# Patient Record
Sex: MALE | Race: WHITE
[De-identification: names, ages, dates, MRNs, and addresses within clinical notes are randomized per-mention and may not be internally consistent; named-entity substitution may affect disease eponyms.]

---

## 2020-08-10 ENCOUNTER — HOSPITAL ENCOUNTER (INPATIENT)
Dept: HOSPITAL 19 - ICU | Age: 80
LOS: 4 days | Discharge: HOME | DRG: 243 | End: 2020-08-14
Attending: STUDENT IN AN ORGANIZED HEALTH CARE EDUCATION/TRAINING PROGRAM | Admitting: STUDENT IN AN ORGANIZED HEALTH CARE EDUCATION/TRAINING PROGRAM
Payer: COMMERCIAL

## 2020-08-10 VITALS — OXYGEN SATURATION: 96 %

## 2020-08-10 VITALS — OXYGEN SATURATION: 98 %

## 2020-08-10 VITALS — OXYGEN SATURATION: 95 %

## 2020-08-10 VITALS — OXYGEN SATURATION: 93 %

## 2020-08-10 VITALS — OXYGEN SATURATION: 94 %

## 2020-08-10 VITALS — WEIGHT: 257.5 LBS | BODY MASS INDEX: 34.88 KG/M2 | HEIGHT: 72.01 IN

## 2020-08-10 VITALS — OXYGEN SATURATION: 97 %

## 2020-08-10 VITALS — SYSTOLIC BLOOD PRESSURE: 133 MMHG | DIASTOLIC BLOOD PRESSURE: 80 MMHG | HEART RATE: 54 BPM

## 2020-08-10 DIAGNOSIS — D64.9: ICD-10-CM

## 2020-08-10 DIAGNOSIS — N39.0: ICD-10-CM

## 2020-08-10 DIAGNOSIS — I44.2: Primary | ICD-10-CM

## 2020-08-10 DIAGNOSIS — I11.0: ICD-10-CM

## 2020-08-10 DIAGNOSIS — Z95.1: ICD-10-CM

## 2020-08-10 DIAGNOSIS — I50.9: ICD-10-CM

## 2020-08-10 DIAGNOSIS — M51.36: ICD-10-CM

## 2020-08-10 DIAGNOSIS — E11.51: ICD-10-CM

## 2020-08-10 DIAGNOSIS — F17.210: ICD-10-CM

## 2020-08-10 DIAGNOSIS — E87.6: ICD-10-CM

## 2020-08-10 DIAGNOSIS — Z66: ICD-10-CM

## 2020-08-10 DIAGNOSIS — I25.10: ICD-10-CM

## 2020-08-10 DIAGNOSIS — G93.49: ICD-10-CM

## 2020-08-10 DIAGNOSIS — E66.9: ICD-10-CM

## 2020-08-10 DIAGNOSIS — R79.89: ICD-10-CM

## 2020-08-10 DIAGNOSIS — E78.5: ICD-10-CM

## 2020-08-10 PROCEDURE — C1898 LEAD, PMKR, OTHER THAN TRANS: HCPCS

## 2020-08-10 PROCEDURE — C1785 PMKR, DUAL, RATE-RESP: HCPCS

## 2020-08-10 PROCEDURE — C1769 GUIDE WIRE: HCPCS

## 2020-08-10 PROCEDURE — C1894 INTRO/SHEATH, NON-LASER: HCPCS

## 2020-08-10 NOTE — NUR
PT ADITTED TO ICU 2 FROM CATH LAB WITH TEMP PACER IN PLACE, PT VSS AT THIS
TIME, DENIES ANY PAIN. PT ORIENTATED TO ROOM AND RE-ORIENTED TO DX, TEMP
PACER, AND POC. WILL CONTINUE TO MONTIOR AND AWAIT ORDERS, FOR MEDS AND
ELECTROLYTE REPLACEMENT.

## 2020-08-10 NOTE — NUR
SEE MERGE DOCUMENTATION FOR MEDICATION ADMINISTRATION TIMES AND INTRA/POST
PROCEDURE SEDATION ASSESSMENTS. PT DIRECT TO CATH LAB VIA EMS. PT A&OX3 AND
VERBALIZES UNDERSTANDING OF PROCEDURE. VERBAL CONSENT OBTAINED. PT ALSO
VERBALLY CONSENTS TO BLOOD ADMIN IF NEEDED. VERIFIED WITH NANCY MOLINA CATH
LAB.

## 2020-08-11 VITALS — OXYGEN SATURATION: 91 %

## 2020-08-11 VITALS — OXYGEN SATURATION: 93 %

## 2020-08-11 VITALS — OXYGEN SATURATION: 92 %

## 2020-08-11 VITALS — HEART RATE: 80 BPM | SYSTOLIC BLOOD PRESSURE: 122 MMHG | TEMPERATURE: 98.7 F | DIASTOLIC BLOOD PRESSURE: 66 MMHG

## 2020-08-11 VITALS — OXYGEN SATURATION: 94 %

## 2020-08-11 VITALS
HEART RATE: 79 BPM | TEMPERATURE: 98 F | SYSTOLIC BLOOD PRESSURE: 131 MMHG | DIASTOLIC BLOOD PRESSURE: 71 MMHG | OXYGEN SATURATION: 94 %

## 2020-08-11 VITALS — OXYGEN SATURATION: 90 %

## 2020-08-11 VITALS
TEMPERATURE: 98.4 F | SYSTOLIC BLOOD PRESSURE: 168 MMHG | DIASTOLIC BLOOD PRESSURE: 75 MMHG | HEART RATE: 61 BPM | OXYGEN SATURATION: 94 %

## 2020-08-11 VITALS — OXYGEN SATURATION: 95 %

## 2020-08-11 VITALS — OXYGEN SATURATION: 96 %

## 2020-08-11 VITALS — HEART RATE: 75 BPM | TEMPERATURE: 98.1 F | DIASTOLIC BLOOD PRESSURE: 68 MMHG | SYSTOLIC BLOOD PRESSURE: 175 MMHG

## 2020-08-11 VITALS — HEART RATE: 68 BPM | DIASTOLIC BLOOD PRESSURE: 66 MMHG | SYSTOLIC BLOOD PRESSURE: 122 MMHG | OXYGEN SATURATION: 91 %

## 2020-08-11 VITALS — HEART RATE: 61 BPM | SYSTOLIC BLOOD PRESSURE: 111 MMHG | DIASTOLIC BLOOD PRESSURE: 57 MMHG

## 2020-08-11 VITALS — DIASTOLIC BLOOD PRESSURE: 102 MMHG | TEMPERATURE: 97.9 F | SYSTOLIC BLOOD PRESSURE: 178 MMHG | HEART RATE: 85 BPM

## 2020-08-11 VITALS — HEART RATE: 65 BPM | SYSTOLIC BLOOD PRESSURE: 130 MMHG | DIASTOLIC BLOOD PRESSURE: 64 MMHG

## 2020-08-11 VITALS — OXYGEN SATURATION: 89 %

## 2020-08-11 VITALS — OXYGEN SATURATION: 88 %

## 2020-08-11 VITALS — HEART RATE: 69 BPM | DIASTOLIC BLOOD PRESSURE: 62 MMHG | SYSTOLIC BLOOD PRESSURE: 144 MMHG | TEMPERATURE: 97.9 F

## 2020-08-11 VITALS — DIASTOLIC BLOOD PRESSURE: 70 MMHG | HEART RATE: 67 BPM | SYSTOLIC BLOOD PRESSURE: 141 MMHG

## 2020-08-11 VITALS
HEART RATE: 66 BPM | TEMPERATURE: 98.5 F | DIASTOLIC BLOOD PRESSURE: 87 MMHG | OXYGEN SATURATION: 93 % | SYSTOLIC BLOOD PRESSURE: 186 MMHG

## 2020-08-11 VITALS
OXYGEN SATURATION: 94 % | TEMPERATURE: 98 F | SYSTOLIC BLOOD PRESSURE: 176 MMHG | DIASTOLIC BLOOD PRESSURE: 100 MMHG | HEART RATE: 68 BPM

## 2020-08-11 VITALS — OXYGEN SATURATION: 85 %

## 2020-08-11 VITALS — HEART RATE: 71 BPM

## 2020-08-11 VITALS — SYSTOLIC BLOOD PRESSURE: 153 MMHG | DIASTOLIC BLOOD PRESSURE: 67 MMHG

## 2020-08-11 VITALS — SYSTOLIC BLOOD PRESSURE: 131 MMHG | TEMPERATURE: 98 F | DIASTOLIC BLOOD PRESSURE: 71 MMHG | HEART RATE: 79 BPM

## 2020-08-11 VITALS — SYSTOLIC BLOOD PRESSURE: 136 MMHG | DIASTOLIC BLOOD PRESSURE: 63 MMHG

## 2020-08-11 VITALS
SYSTOLIC BLOOD PRESSURE: 136 MMHG | OXYGEN SATURATION: 93 % | HEART RATE: 74 BPM | DIASTOLIC BLOOD PRESSURE: 76 MMHG | TEMPERATURE: 98 F

## 2020-08-11 VITALS — OXYGEN SATURATION: 87 %

## 2020-08-11 VITALS — OXYGEN SATURATION: 86 %

## 2020-08-11 VITALS
HEART RATE: 69 BPM | OXYGEN SATURATION: 91 % | SYSTOLIC BLOOD PRESSURE: 136 MMHG | TEMPERATURE: 98 F | DIASTOLIC BLOOD PRESSURE: 76 MMHG

## 2020-08-11 VITALS — HEART RATE: 80 BPM

## 2020-08-11 LAB
ALBUMIN SERPL-MCNC: 2.8 GM/DL (ref 3.5–5)
ALP SERPL-CCNC: 99 U/L (ref 50–136)
ALT SERPL-CCNC: 13 U/L (ref 4–49)
ANION GAP SERPL CALC-SCNC: 2 MMOL/L (ref 7–16)
ANION GAP SERPL CALC-SCNC: 3 MMOL/L (ref 7–16)
AST SERPL-CCNC: 17 U/L (ref 15–37)
BASOPHILS # BLD: 0 10*3/UL (ref 0–0.2)
BASOPHILS # BLD: 0 10*3/UL (ref 0–0.2)
BASOPHILS NFR BLD AUTO: 0.3 % (ref 0–2)
BASOPHILS NFR BLD AUTO: 0.3 % (ref 0–2)
BILIRUB SERPL-MCNC: 0.6 MG/DL (ref 0–1)
BUN SERPL-MCNC: 10 MG/DL (ref 9–20)
BUN SERPL-MCNC: 11 MG/DL (ref 9–20)
CALCIUM SERPL-MCNC: 8 MG/DL (ref 8.4–10.2)
CALCIUM SERPL-MCNC: 8 MG/DL (ref 8.4–10.2)
CHLORIDE SERPL-SCNC: 109 MMOL/L (ref 98–107)
CHLORIDE SERPL-SCNC: 110 MMOL/L (ref 98–107)
CO2 SERPL-SCNC: 26 MMOL/L (ref 22–30)
CO2 SERPL-SCNC: 26 MMOL/L (ref 22–30)
CREAT SERPL-SCNC: 0.82 UMOL/L (ref 0.66–1.25)
CREAT SERPL-SCNC: 0.82 UMOL/L (ref 0.66–1.25)
EOSINOPHIL # BLD: 0.2 10*3/UL (ref 0–0.7)
EOSINOPHIL # BLD: 0.2 10*3/UL (ref 0–0.7)
EOSINOPHIL NFR BLD: 2.8 % (ref 0–4)
EOSINOPHIL NFR BLD: 2.8 % (ref 0–4)
ERYTHROCYTE [DISTWIDTH] IN BLOOD BY AUTOMATED COUNT: 14.1 % (ref 11.5–14.5)
ERYTHROCYTE [DISTWIDTH] IN BLOOD BY AUTOMATED COUNT: 14.2 % (ref 11.5–14.5)
GLUCOSE SERPL-MCNC: 100 MG/DL (ref 74–106)
GLUCOSE SERPL-MCNC: 133 MG/DL (ref 74–106)
GRANULOCYTES # BLD AUTO: 72.2 % (ref 42.2–75.2)
GRANULOCYTES # BLD AUTO: 73.1 % (ref 42.2–75.2)
HCT VFR BLD AUTO: 37.5 % (ref 42–52)
HCT VFR BLD AUTO: 38 % (ref 42–52)
HGB BLD-MCNC: 12.5 G/DL (ref 13.5–18)
HGB BLD-MCNC: 12.6 G/DL (ref 13.5–18)
LYMPHOCYTES # BLD: 1 10*3/UL (ref 1.2–3.4)
LYMPHOCYTES # BLD: 1 10*3/UL (ref 1.2–3.4)
LYMPHOCYTES NFR BLD: 15.5 % (ref 20–51)
LYMPHOCYTES NFR BLD: 16.2 % (ref 20–51)
MAGNESIUM SERPL-MCNC: 1.9 MG/DL (ref 1.6–2.3)
MCH RBC QN AUTO: 32 PG (ref 27–31)
MCH RBC QN AUTO: 32 PG (ref 27–31)
MCHC RBC AUTO-ENTMCNC: 33 G/DL (ref 33–37)
MCHC RBC AUTO-ENTMCNC: 33 G/DL (ref 33–37)
MCV RBC AUTO: 96 FL (ref 80–100)
MCV RBC AUTO: 96 FL (ref 80–100)
MONOCYTES # BLD: 0.5 10*3/UL (ref 0.1–0.6)
MONOCYTES # BLD: 0.5 10*3/UL (ref 0.1–0.6)
MONOCYTES NFR BLD AUTO: 8 % (ref 1.7–9.3)
MONOCYTES NFR BLD AUTO: 8.2 % (ref 1.7–9.3)
NEUTROPHILS # BLD: 4.4 10*3/UL (ref 1.4–6.5)
NEUTROPHILS # BLD: 4.6 10*3/UL (ref 1.4–6.5)
PLATELET # BLD AUTO: 147 K/MM3 (ref 130–400)
PLATELET # BLD AUTO: 150 K/MM3 (ref 130–400)
PMV BLD AUTO: 11.9 FL (ref 7.4–10.4)
PMV BLD AUTO: 11.9 FL (ref 7.4–10.4)
POTASSIUM SERPL-SCNC: 3.5 MMOL/L (ref 3.4–5)
POTASSIUM SERPL-SCNC: 3.7 MMOL/L (ref 3.4–5)
PROT SERPL-MCNC: 5.7 GM/DL (ref 6.4–8.2)
RBC # BLD AUTO: 3.9 M/MM3 (ref 4.2–5.6)
RBC # BLD AUTO: 3.97 M/MM3 (ref 4.2–5.6)
SODIUM SERPL-SCNC: 137 MMOL/L (ref 137–145)
SODIUM SERPL-SCNC: 138 MMOL/L (ref 137–145)

## 2020-08-11 PROCEDURE — 0JH606Z INSERTION OF PACEMAKER, DUAL CHAMBER INTO CHEST SUBCUTANEOUS TISSUE AND FASCIA, OPEN APPROACH: ICD-10-PCS | Performed by: INTERNAL MEDICINE

## 2020-08-11 PROCEDURE — 02H63JZ INSERTION OF PACEMAKER LEAD INTO RIGHT ATRIUM, PERCUTANEOUS APPROACH: ICD-10-PCS | Performed by: INTERNAL MEDICINE

## 2020-08-11 PROCEDURE — 02HK3JZ INSERTION OF PACEMAKER LEAD INTO RIGHT VENTRICLE, PERCUTANEOUS APPROACH: ICD-10-PCS | Performed by: INTERNAL MEDICINE

## 2020-08-11 NOTE — NUR
SW attended clinical rounds. The patient is to have a pacemaker placed today.
SW then followed up with the patient to discuss discharge plan. The patient
was living alone in [place], [place] and was on his way to [place], [place] to live
with his girlfriend, Nancy Valverde (ph#[phone]). He states that his
belongings are being shipped from [place] and should be arriving in
[facility] soon. He reports independence with ADLs and has a cane and four wheel
walker. The patient's PCP in [place] was Dr. Mendoza. He reports no difficulties
obtaining his meds. The patient does not have a DPOA-HC completed, but he was
interested in obtaining a form. SW provided the form. The patient states that
he is not , does not have any children, and does not have any siblings.
SW asked him about Demetra Obregon on his person to notify, when states she is
his daughter. The patient states that she is his ex-fiancee's daughter. The
patient states that he has already been in contact with his insurance company
and plans to rent a car after he discharges and drive to [facility]. SW to
continue to follow.

## 2020-08-11 NOTE — NUR
Patient was transferred from ICU after pacemaker was placed. patient
complained of 3/10 pain. Insulin not required at this time, . Patient
tolerate diet. educated patient on left extremity restriction post pacmaker
placement.

## 2020-08-11 NOTE — NUR
ECHO tech in room. Pt AAOx4, transvenous pacer insertion site stable
MD Jemal at bedside, plan to keep pt NPO for dual chamber PPM. EKG to be
completed while pacer is paused - RT notified
Call light in reach

## 2020-08-11 NOTE — NUR
Resting in bed. Assessment complete. Lungs clear. Heart sounds normal. Bowels
active x4. Pulses present. Bilateral lower extremity edema +2. INT right AC
without complications. Reporting pain in left shoulder at pacer site. Will
provided PRN tylenol. Denies other needs at this time. Call light in reach.

## 2020-08-12 VITALS — HEART RATE: 64 BPM | SYSTOLIC BLOOD PRESSURE: 138 MMHG | DIASTOLIC BLOOD PRESSURE: 89 MMHG | TEMPERATURE: 98.1 F

## 2020-08-12 VITALS — DIASTOLIC BLOOD PRESSURE: 69 MMHG | SYSTOLIC BLOOD PRESSURE: 154 MMHG | HEART RATE: 72 BPM | TEMPERATURE: 97.6 F

## 2020-08-12 VITALS — SYSTOLIC BLOOD PRESSURE: 149 MMHG | TEMPERATURE: 97.9 F | HEART RATE: 73 BPM | DIASTOLIC BLOOD PRESSURE: 63 MMHG

## 2020-08-12 VITALS — HEART RATE: 77 BPM | TEMPERATURE: 98.2 F | SYSTOLIC BLOOD PRESSURE: 138 MMHG | DIASTOLIC BLOOD PRESSURE: 69 MMHG

## 2020-08-12 VITALS — DIASTOLIC BLOOD PRESSURE: 54 MMHG | HEART RATE: 79 BPM | TEMPERATURE: 98.2 F | SYSTOLIC BLOOD PRESSURE: 118 MMHG

## 2020-08-12 VITALS — TEMPERATURE: 98 F | SYSTOLIC BLOOD PRESSURE: 139 MMHG | HEART RATE: 65 BPM | DIASTOLIC BLOOD PRESSURE: 62 MMHG

## 2020-08-12 NOTE — NUR
Pt resting in the room, no C/O pain today, both IJ and pacermaker sited remain
CDI, VS have remained stable.

## 2020-08-12 NOTE — NUR
collaborated with NANCY Ayers and House Supervisor about discharge
plan. Patient is not able to continue driving to [facility] due to his pacemaker
being placed. SW met with patient who advised he can afford a bus ticket. SW
assisted patient with purchasing a [employer] ticket. Departure will be
08/14/20 @ 0630 from [place], [place]. House Supervisor confirmed [employer] will
provide transport to [place]. KATIE will follow up with [employer] tomorrow to
schedule a ride from the hospital to [place]. KATIE will print patient's ticket
and provide it to him tomorrow. Confirmation #[id number]. KATIE provided update to
patient's girlfriend, Nancy Valverde. KATIE will continue to follow.

## 2020-08-12 NOTE — NUR
Pt assessment completed and documented. Pt sitting up in chair at this time
watching television. Pt alert and oriented x3. Complaints of "itching" at
pacemaker incision site that pt states the ice pack he has on it is relieving
some of the discomfort. INT to right ac patent and has good blood return.
Telemetry on. Pt denies any other needs at this time. Call light within reach.
Will continue to monitor.

## 2020-08-12 NOTE — NUR
Patient had uneventful night. Pacer interrogation complete. Denies needs at
this time. Call light in reach.

## 2020-08-12 NOTE — NUR
Pt awake and alert upon entry, talkative, no C/O pain at this time, pacemaker
site CDI, IJ site CDI, shift assessments complete, left Pt call light in
reach, bed in lowest position.

## 2020-08-13 VITALS — HEART RATE: 70 BPM | SYSTOLIC BLOOD PRESSURE: 129 MMHG | TEMPERATURE: 97.5 F | DIASTOLIC BLOOD PRESSURE: 67 MMHG

## 2020-08-13 VITALS — HEART RATE: 67 BPM | DIASTOLIC BLOOD PRESSURE: 64 MMHG | SYSTOLIC BLOOD PRESSURE: 144 MMHG | TEMPERATURE: 98.6 F

## 2020-08-13 VITALS — DIASTOLIC BLOOD PRESSURE: 62 MMHG | TEMPERATURE: 98.2 F | HEART RATE: 76 BPM | SYSTOLIC BLOOD PRESSURE: 130 MMHG

## 2020-08-13 VITALS — TEMPERATURE: 97.9 F | HEART RATE: 75 BPM | SYSTOLIC BLOOD PRESSURE: 117 MMHG | DIASTOLIC BLOOD PRESSURE: 56 MMHG

## 2020-08-13 VITALS — HEART RATE: 68 BPM | TEMPERATURE: 97.5 F | DIASTOLIC BLOOD PRESSURE: 64 MMHG | SYSTOLIC BLOOD PRESSURE: 123 MMHG

## 2020-08-13 VITALS — HEART RATE: 93 BPM | TEMPERATURE: 97.4 F | DIASTOLIC BLOOD PRESSURE: 74 MMHG | SYSTOLIC BLOOD PRESSURE: 139 MMHG

## 2020-08-13 VITALS — HEART RATE: 84 BPM | SYSTOLIC BLOOD PRESSURE: 114 MMHG | TEMPERATURE: 98.4 F | DIASTOLIC BLOOD PRESSURE: 70 MMHG

## 2020-08-13 LAB
ANION GAP SERPL CALC-SCNC: 6 MMOL/L (ref 7–16)
BASOPHILS # BLD: 0 10*3/UL (ref 0–0.2)
BASOPHILS NFR BLD AUTO: 0.5 % (ref 0–2)
BUN SERPL-MCNC: 19 MG/DL (ref 9–20)
CALCIUM SERPL-MCNC: 8.1 MG/DL (ref 8.4–10.2)
CHLORIDE SERPL-SCNC: 99 MMOL/L (ref 98–107)
CO2 SERPL-SCNC: 32 MMOL/L (ref 22–30)
CREAT SERPL-SCNC: 1.01 UMOL/L (ref 0.66–1.25)
EOSINOPHIL # BLD: 0.2 10*3/UL (ref 0–0.7)
EOSINOPHIL NFR BLD: 2.8 % (ref 0–4)
ERYTHROCYTE [DISTWIDTH] IN BLOOD BY AUTOMATED COUNT: 14.1 % (ref 11.5–14.5)
GLUCOSE SERPL-MCNC: 133 MG/DL (ref 74–106)
GRANULOCYTES # BLD AUTO: 69 % (ref 42.2–75.2)
HCT VFR BLD AUTO: 39.4 % (ref 42–52)
HGB BLD-MCNC: 13.3 G/DL (ref 13.5–18)
LYMPHOCYTES # BLD: 1.1 10*3/UL (ref 1.2–3.4)
LYMPHOCYTES NFR BLD: 17.6 % (ref 20–51)
MCH RBC QN AUTO: 32 PG (ref 27–31)
MCHC RBC AUTO-ENTMCNC: 34 G/DL (ref 33–37)
MCV RBC AUTO: 95 FL (ref 80–100)
MONOCYTES # BLD: 0.6 10*3/UL (ref 0.1–0.6)
MONOCYTES NFR BLD AUTO: 9.2 % (ref 1.7–9.3)
NEUTROPHILS # BLD: 4.4 10*3/UL (ref 1.4–6.5)
PLATELET # BLD AUTO: 113 K/MM3 (ref 130–400)
PMV BLD AUTO: 12.8 FL (ref 7.4–10.4)
POTASSIUM SERPL-SCNC: 3.1 MMOL/L (ref 3.4–5)
RBC # BLD AUTO: 4.16 M/MM3 (ref 4.2–5.6)
SODIUM SERPL-SCNC: 137 MMOL/L (ref 137–145)

## 2020-08-13 NOTE — NUR
contacted [facility] and scheduled  time for tomorrow,
08/14/20 @ 0445. Patient will be picked up in the ED entrance. KATIE met with
patient and provided print out of his boarding passes. There is one for when
he arrives in [place], and another that he will need to present when they
arrive at [place], [place] and change busses. KATIE also printed out a purchase
receipt and trip schedule, which was emailed to KATIE. KATIE also made a copy and
placed on patient's chart, as a back up. SW provided  time for taxi
tomorrow along with price of ride to [place] which will be $120. Patient is
frustrated with the cost of the taxi ride and bus trip, but states he can
afford it as he was able to increase the limit on his credit card. KATIE
confirmed with [facility] Flood that they can take a credit or debit card. KATIE
reviewed schedule with patient who verbalized understanding. KATIE encouraged
patient to ask for help from [facility] staff if he has any questions when he
switches buses in [place]. Patient again verbalized understanding. KATIE
contacted patient's girlfriend, Nancy and left a message. KATIE collaborated the
above information to House Supervisor and RN, Armen. KATIE spoke with Armen about
having patient down in the ED entrance at 0430 to wait for Jamar Flood to
arrive. No additional needs at this time.

## 2020-08-13 NOTE — NUR
PATIENT UP INDEPENDENTLY IN ROOM AND HALLWAYS WITH NO C/O. TELE IN PLACE.
CHANGE OF SHIFT REPORT RECEIVED FROM DAY SHIFT NURSE.

## 2020-08-13 NOTE — NUR
Pt awake and alert upon entry, finished breakfast, no C/O pain this morning.
Shift assessments complete, left Pt call light in reach.

## 2020-08-13 NOTE — NUR
Pt had uneventful shift. Pt went to bed around 0100 after sitting up in his
recliner all evening. Pt has been resting in bed since. Complaints of itching
discomfort to pacemaker site overnight. INT to right ac patent and intact.
Dressing to pacemaker site CDI. Pt denies any other needs. Call light within
reach.

## 2020-08-13 NOTE — NUR
TELE IN PLACE, PATIENT WITH NO C/O CHEST PAIN OR SHORTNESS OF BREATH. REPORTS
ANTICIPATED DISCHARGE EARLY AM, TO LEAVE HOSPITAL FOR BUS TRIP TO [place] AT
0430 ON 8/14, WANTING TO MAKE SURE ALL HIS PAPERWORK IS READY AND IN ORDER FOR
HIM TO TAKE WITH HIM ON DISCHARGE. DENIES ANY NEEDS AT THIS TIME. UP
INDEPENDENTLY WITH NO PROBLEMS. DRESSING TO LEFT UPPER CHEST PACEMAKER SX SITE
CDI, REVIEWED KEEPING PACEMAKER INCISION COVERED DURING SHOWERS, CONTINUE WITH
PACEMAKER RESTRICTION, TO WEAR SLING TO LUE WHEN IN BED/DURING SLEEP.

## 2020-08-14 VITALS — TEMPERATURE: 97.6 F | DIASTOLIC BLOOD PRESSURE: 64 MMHG | SYSTOLIC BLOOD PRESSURE: 126 MMHG | HEART RATE: 78 BPM

## 2020-08-14 VITALS — HEART RATE: 70 BPM | SYSTOLIC BLOOD PRESSURE: 137 MMHG | TEMPERATURE: 98.4 F | DIASTOLIC BLOOD PRESSURE: 91 MMHG

## 2020-08-14 NOTE — NUR
TELE REPORTED PATIENT WITH AFIB W/RVR RUN LASTING 25-30 SEC WITH ,
RHYTHM NOW CURRENTLY PACED W/SPIKES. VS TAKEN, SEE [employer] FOR RESULTS WITH
PATIENT DENYING CHEST PAIN/SHORTNESS OF BREATHE. DR FRENCH INFORMED OF TELE
REPORT AND SUBSEQUENT ASSESSMENT WITH NO NEW ORDERS GIVEN.

## 2020-08-14 NOTE — NUR
was notified by House Supervisor that [facility] did not show up
to  patient this morning. KATIE collaborated with House SupervisorAzucena in Risk Management, and Stacey with the [facility] to establish
discharge plan. With approval from Stacey, KATIE purchased a bus ticket for
tomorrow, 08/15/20. Departure will be from [place] at 0630. Cost of ticket is
$186.99. KATIE provided printed tickets and schedule to patient. KATIE collaborated
with House Supervisor and Stacey to reserve a hotel room this evening in
[place]. KATIE was advised we contract with with [facility]. KATIE spoke with
David at the [facility] and made a reservation for this evening. Cost of hotel
is $105.81. K[facility]. Ray advised he could pick patient up today at 1500 to take patient
to the [facility] and could also  patient tomorrow, 08/15/20 @ 0400 at
[facility] [place] Cost of both
trips totals $130. KATIE collaborated with Stacey who provided check made out to
Ray Marinelli. KATIE contacted Ray again to notify him of method of payment
and to again confirm  time of tomorrow, 08/15/20 @ 0400. Ray states
he understands the importance of this and also verbalized understanding that
he is being paid upfront, so he needs to arrive on time tomorrow. KATIE provided
 time and contact information [facility] to patient.
Patient verbalized understanding of the plan including transportation
arrangements and bus schedule. KATIE assisted patient in setting an alarm on his
phone for tomorrow at 0300. KATIE also contacted David at the [facility] and
requested a wake up call of 0300. David advised that they will call up to the
room and if patient does not answer, they will send a staff member up to knock
on the door. KATIE stressed the importance of patient getting up on time
tomorrow. KATIE emailed receipts for bus ticket and hotel room to Stacey. KATIE
provided update to House Supervisor and RNLydia. KATIE contacted patient's
girlfriend, Nancy to review the plan. Nancy verbalized understanding and
confirms that she will  patient tomorrow, 05/15/20 at [place], [place]. No
additional needs at this time.

## 2020-08-17 NOTE — NUR
followed up with patient's girlfriend, Nancy who advised patient
made it to [place] safely. Patient has an appointment with a PA tomorrow to
establish primary care. No additional needs at this time.